# Patient Record
Sex: FEMALE | Race: OTHER | HISPANIC OR LATINO | URBAN - METROPOLITAN AREA
[De-identification: names, ages, dates, MRNs, and addresses within clinical notes are randomized per-mention and may not be internally consistent; named-entity substitution may affect disease eponyms.]

---

## 2024-12-03 ENCOUNTER — DOCUMENTATION (OUTPATIENT)
Dept: ENDOCRINOLOGY | Facility: CLINIC | Age: 44
End: 2024-12-03
Payer: COMMERCIAL

## 2024-12-04 ENCOUNTER — CONSULT (OUTPATIENT)
Dept: ENDOCRINOLOGY | Facility: CLINIC | Age: 44
End: 2024-12-04
Payer: COMMERCIAL

## 2024-12-04 VITALS
SYSTOLIC BLOOD PRESSURE: 127 MMHG | HEART RATE: 92 BPM | DIASTOLIC BLOOD PRESSURE: 81 MMHG | BODY MASS INDEX: 28.88 KG/M2 | WEIGHT: 163 LBS | HEIGHT: 63 IN

## 2024-12-04 DIAGNOSIS — Z31.41 FERTILITY TESTING: Primary | ICD-10-CM

## 2024-12-04 PROCEDURE — 99215 OFFICE O/P EST HI 40 MIN: CPT | Performed by: STUDENT IN AN ORGANIZED HEALTH CARE EDUCATION/TRAINING PROGRAM

## 2024-12-04 PROCEDURE — 99205 OFFICE O/P NEW HI 60 MIN: CPT | Performed by: STUDENT IN AN ORGANIZED HEALTH CARE EDUCATION/TRAINING PROGRAM

## 2024-12-04 ASSESSMENT — PATIENT HEALTH QUESTIONNAIRE - PHQ9
1. LITTLE INTEREST OR PLEASURE IN DOING THINGS: NOT AT ALL
SUM OF ALL RESPONSES TO PHQ9 QUESTIONS 1 AND 2: 0
2. FEELING DOWN, DEPRESSED OR HOPELESS: NOT AT ALL

## 2024-12-04 ASSESSMENT — COLUMBIA-SUICIDE SEVERITY RATING SCALE - C-SSRS
6. HAVE YOU EVER DONE ANYTHING, STARTED TO DO ANYTHING, OR PREPARED TO DO ANYTHING TO END YOUR LIFE?: NO
1. IN THE PAST MONTH, HAVE YOU WISHED YOU WERE DEAD OR WISHED YOU COULD GO TO SLEEP AND NOT WAKE UP?: NO
2. HAVE YOU ACTUALLY HAD ANY THOUGHTS OF KILLING YOURSELF?: NO

## 2024-12-04 ASSESSMENT — PAIN SCALES - GENERAL: PAINLEVEL_OUTOF10: 0-NO PAIN

## 2024-12-04 NOTE — PROGRESS NOTES
Visit Type: In Person    NEW FERTILITY PATIENT VISIT    Referred by:  Feroz  Accompanied today by:        Toño Jefferson is a 44 y.o.  female who presents with 1 year history of TTC. She and her  began evaluation in 2023 at [facility], [place], operated by [facility] and started monitored OI/TIC cycles without success.     PRIOR EVALUATION / TREATMENT  2023 started OI/trigger/TIC at [facility], [place]  3/1/2024 hysteroscopic polypectomy  GYN Pelvic Ultrasound: US PELVIS TRANSVAGINAL (2024):  AFC = 2, subserous posterior fibroid approx 2 cm    Prior Labs  4/3/2024 AMH 0.11    Relationship Status:   since     OB Hx   G0    GYN HISTORY   Have you ever been diagnosed with a sexually transmitted disease?  no  Please select all that are applicable:    Have you ever had Pelvic Inflammatory Disease?  no  Have you had an abnormal PAP smear?  no  Date & Result of last PAP smear:  reportedly normal  Have you ever had an abnormal Mammogram?  BI-RADS 2  Date & result of your last mammogram:  2022  Do you have pelvic pain?  no  How many times per week do you have intercourse?    Do you have pain with intercourse?  no  Do you use lubricants with intercourse?    Do you have pain with bowel movements?   With constipation           Do you have pain with a full bladder?  no  MENSTRUAL HISTORY  LMP:  2024  Menarche:  12  Contraception:  none  Cycle length:  irregular, 15-30 days  Describe your bleedin-5 day, moderate  Dysmenorrhea:  no     ENDOCRINE/INFERTILITY HISTORY  Duration of infertility:  1 year  Coital Activity/week:    Nipple Discharge:  no  Vision changes:  no  Headaches:  migraines without auras  Excess hair growth:  no  Excessive hair loss:  yes  Acne:  yes  Oily skin:  no  Recent weight change  Weight gain: no    Weight loss:  no  Exercise more than 3 times a week:  no    PMH  Esophagitis, H. Pylori      MEDICATIONS  No current outpatient medications on file prior to visit.     No current  facility-administered medications on file prior to visit.    Vitamins (CoQ10, B12, D, C, DHEA, collagen)    PSH  Past Surgical History:   Procedure Laterality Date    HYSTEROSCOPY W/ POLYPECTOMY      Skull as a child   Endoscopy    PSYCH HISTORY  Have you ever been diagnosed with a mental health Issue?  No  Have you ever been hospitalized for a mental health disorder?  No     SOCIAL HISTORY  Social History     Tobacco Use    Smoking status: Never     Passive exposure: Never    Smokeless tobacco: Never   Substance Use Topics    Alcohol use: Never    Drug use: Never     Occupation:  [employer]  Have you ever been incarcerated?  No  Do you have a history of domestic violence?  No  Do you feel safe at home?  Yes  Do you have a history of any negative sexual experience such as incest or rape?  No     PARTNER HISTORY  Partner Name:  Andrew Jefferson  Partner :  10/9/1979  Partner email:    Occupation:  EMS  Prior fertility history:  no  PMH:  Eric  PSH:  stitches under eye and finger, endoscopy  Smoking:  no  Alcohol Use:  no  Drug Use:  no  Medications:  Vitamins (CoQ10, B12, D, C, DHEA, collagen)  Injuries:  no  STD:  no  Please select all that are applicable:    SA:  yes at [facility]  SA Results:    Volume  mL 1   Comment: * pH = 8.5   Agglutination  0 - 2 0   Round Cells  0 - 6 per HPF 2   Sperm Concentration  >15 million/mL 88   Sperm Count  >39 million/ejaculate 88   Overall Sperm Motility  >40 % 60   Progressive Sperm Motility  >32 % 57   Strict Normal Sperm Morphology  >4 %        FAMILY HISTORY  Family History   Problem Relation Name Age of Onset    Diabetes Mother      Hypertension Mother      Hypertension Father         CANCER HISTORY    Breast:  No  Ovarian:  No  Colon:  No  Endometrial:  No    FAMILY VTE HISTORY  Family History of Blood Clots:  No    GENETIC HISTORY  Ethnic Background  Patient:  Honduran, Nepali, Kyrgyz  Partner:  Latvian, Papua [place] Guinean, Burkinan, Ukranian   Genetic Disease in  "Family  Patient:  No  Partner:  No (MS in maternal side)  Birth Defects in Family  Patient:  No  Partner:  No  Genetic screening performed previously:  No     BMI:   BMI Readings from Last 1 Encounters:   24 28.52 kg/m²     VITALS:  /81   Pulse 92   Ht 1.61 m (5' 3.39\")   Wt 73.9 kg (163 lb)   LMP 2024   BMI 28.52 kg/m²     ASSESSMENT   44 y.o.  female with primary infertility x 1 year, suspected oligoovulation and the following pertinent medical issues: age 44.  Partner SA: Normal    COUNSELING  We discussed causes of infertility including hormonal, egg quality issues, structural problems such as endometriosis, adhesions, or tubal problems, uterine factors such as polyps or fibroids, and sperm issues. Reviewed evaluation of such as well. We discussed various methods for achieving pregnancy in some detail including, ovulation induction, insemination, superovulation and IVF.    We discussed that Clomid is used for ovulation induction. We discussed common side effects of Clomid including headaches, vision changes, hot flashes, mood changes, and breast tenderness.  We discussed that there is an increased risk of multiple gestation with Clomid approximately 10% for twins and less than 1% for triplets.  Counseled regarding option of selective reduction for higher order multiples.    We discussed the impact of age on fertility. We discussed that a woman is born with all of the follicles that she will have in her lifetime and that these numbers progressively decrease as the patient reaches menopause. We discussed that women can remain fertile into their late 30’s and even early 40’s, however, chance for success is significantly lower for women who have infertility. We also discussed the higher rates of aneuploidy and miscarriage that occur as women age.    We discussed AMH testing and the patient's AMH level, if applicable.  AMH is considered favorable if >1 however this test has many " "limitations including poor predictive rates of pregnancy. In randomized control trials such as \"Time to conceive\" pts with low AMH levels (<0.7) has similar cumulative pregnancy rates compared with women that had normal AMH levels.  In the \"AMIGOS\" study, AMH did not predict pregnancy rates following OS/IUI for unexplained infertility. However,  AMH is a marker that is helpful to predict how a patient may respond or oocyte yields with FSH and ART treatments, but again there is conflicting data about how this affects pregnancy rates with ART.    Routine Testing  Fertility Center  STDs Within 1 year   Genetic carrier Waiver/Completed   T&S Within 1 year   AMH Within 1 year   TSH Within 1 year   Rubella/Varicella Within 5 years     PLAN  No orders of the defined types were placed in this encounter.  Partner STIs and semen freeze if desire to improve scheduling of IUI    GENETIC SCREENING PATIENT  Decline    PARTNER  Yes Semen Analysis: Ordered FREEZE ordered in the event not able to be present for IUI  NA    FOLLOW UP   Consults:  none  Chart to primary nurse for care coordination and patient check list/education  Enroll in Engaged MD  Take prenatal vitamins, vitamin D 2000 IUs daily  Discussed that pap and mammogram must be updated per ACOG guidelines before treatment can begin  Discussed that treatment cannot proceed until checklist items are complete   6 week follow up with MD - only if desire to move forward with donor IVF or if approved for 1 autologous IVF cycle  Additional testing for BMI < 18 or > 40: NA  Sperm Donor:      MD Completion:  Ectopic Risk: No  Medically Complex: No    Fertility Plan Update: 1 cycle of clomid 100 mg on CD3-7 with monitoring, trigger, and IUI prior to 45th birthday    Mayra Shaffer  12/04/2024  3:16 PM    "

## 2024-12-06 ENCOUNTER — TELEPHONE (OUTPATIENT)
Dept: ENDOCRINOLOGY | Facility: CLINIC | Age: 44
End: 2024-12-06
Payer: COMMERCIAL

## 2024-12-06 DIAGNOSIS — Z01.83 ENCOUNTER FOR RH BLOOD TYPING: ICD-10-CM

## 2024-12-06 DIAGNOSIS — N97.9 FEMALE INFERTILITY: ICD-10-CM

## 2024-12-06 DIAGNOSIS — Z31.89 ENCOUNTER FOR ARTIFICIAL INSEMINATION: ICD-10-CM

## 2024-12-06 DIAGNOSIS — Z11.3 SCREENING FOR STDS (SEXUALLY TRANSMITTED DISEASES): ICD-10-CM

## 2024-12-06 DIAGNOSIS — Z11.59 ENCOUNTER FOR SCREENING FOR OTHER VIRAL DISEASES: ICD-10-CM

## 2024-12-06 NOTE — PROGRESS NOTES
[place] Oral TIC/IUI    Age: 44 y.o.    Provider: Mayra Shaffer MD  Primary RN: Linda Sanchez  Reasons for Treatment: Diminished Ovarian Reserve  Last BMI  24 : 28.52 kg/m²       No past medical history on file.    Date Done Consultation Results/Comments   2024 Medication Protocol   1 cycle of clomid 100 mg on CD3-7 with monitoring, trigger, and IUI prior to 45th birthday    2024 Procedure Order Placed [x]     Date Done Female Labs Results/Comments   2024 T&S (Q 1 Year) ABO: A  Rh: POS  Antibody: NEG     2024 Hep B sAg Nonreactive   2024 Hep C AB Nonreactive   2024 HIV Nonreactive   2024 Syphilis Nonreactive   2024 GC/CT GC: Negative  CT: Negative   2024 Rubella (Q 5 Years) Positive   2024 Varicella (Q 5 Years) Positive (A)    Carrier Screening Declined  N/A     GYN Waiver       Date Done Male Labs (required if IUI)   Results/Comments  JACQUELIN BRIGGS (MRN: [id number])   2024 Hep B sAg Nonreactive   2024 Hep C AB  Nonreactive   2024 HIV Nonreactive   2024 Syphilis Nonreactive   2024 GC/CT GC: Negative  CT: Negative    Carrier Screening Declined  N/A   2024 Semen Analysis  Volume(mL): 1  Count(million): 88  Motility(%): 60%     MD Completion:  Ectopic Risk: No  Medically Complex: No    Mayra Shaffer 24 12:45 PM

## 2024-12-06 NOTE — TELEPHONE ENCOUNTER
Placed call to patient via Belize[place] . Patient's  (patient's advocate) answered phone who speaks english and does not need . Disconnected from phone call and dialed back patient's advocate without use of  as requested. Patient's  stated he does not want to be contacted via  if calling him as he speaks english but that wife would need an .     Discussed that since patient received care outside of , we will need her STD results, varicella/rubella titers, and type and screen. Also reviewed that we would need his STD records as well. Patient's  stated he did not have these done and was not aware these would be needed. Reviewed that these are a requirement for treatment and that he and the patient can go to any outpatient  lab to complete these.     Patient relayed he is planning to do a sperm freeze for the IUI cycle given possibility that he may not make it to the IUI with his work schedule. Reviewed that is he planning to be here the day of the IUI, we would plan to do a fresh collection here in the clinic -- but if he opts to do a freeze that is okay as well and he relayed he has spoken to financial for cost awareness. Has [facility] contact information if he has any questions regarding storage / cryopreservation.     Patient's  raised concern regarding cost of lab work as he was not made aware of needing lab work, apologized to patient and reviewed these are out requirements and to double check to ensure these weren't previously drawn through [facility] within the last year to be safe. Otherwise they will go to an outpatient lab for lab work. Will pend orders to Dr. Munoz today for labs.     No further questions at this time. Verbalized understanding of this plan.     BROOKE CROSS RN 12/06/2024 11:02

## 2024-12-07 ENCOUNTER — LAB (OUTPATIENT)
Dept: LAB | Facility: LAB | Age: 44
End: 2024-12-07
Payer: COMMERCIAL

## 2024-12-07 DIAGNOSIS — Z11.3 SCREENING FOR STDS (SEXUALLY TRANSMITTED DISEASES): ICD-10-CM

## 2024-12-07 DIAGNOSIS — Z01.83 ENCOUNTER FOR RH BLOOD TYPING: ICD-10-CM

## 2024-12-07 DIAGNOSIS — Z11.59 ENCOUNTER FOR SCREENING FOR OTHER VIRAL DISEASES: ICD-10-CM

## 2024-12-07 LAB
ABO GROUP (TYPE) IN BLOOD: NORMAL
ANTIBODY SCREEN: NORMAL
HBV SURFACE AG SERPL QL IA: NONREACTIVE
HCV AB SER QL: NONREACTIVE
RH FACTOR (ANTIGEN D): NORMAL

## 2024-12-07 PROCEDURE — 86787 VARICELLA-ZOSTER ANTIBODY: CPT

## 2024-12-07 PROCEDURE — 86803 HEPATITIS C AB TEST: CPT

## 2024-12-07 PROCEDURE — 86317 IMMUNOASSAY INFECTIOUS AGENT: CPT

## 2024-12-07 PROCEDURE — 86850 RBC ANTIBODY SCREEN: CPT

## 2024-12-07 PROCEDURE — 86780 TREPONEMA PALLIDUM: CPT

## 2024-12-07 PROCEDURE — 87389 HIV-1 AG W/HIV-1&-2 AB AG IA: CPT

## 2024-12-07 PROCEDURE — 87340 HEPATITIS B SURFACE AG IA: CPT

## 2024-12-07 PROCEDURE — 86900 BLOOD TYPING SEROLOGIC ABO: CPT

## 2024-12-07 PROCEDURE — 86901 BLOOD TYPING SEROLOGIC RH(D): CPT

## 2024-12-07 PROCEDURE — 36415 COLL VENOUS BLD VENIPUNCTURE: CPT

## 2024-12-07 PROCEDURE — 87591 N.GONORRHOEAE DNA AMP PROB: CPT

## 2024-12-07 PROCEDURE — 87491 CHLMYD TRACH DNA AMP PROBE: CPT

## 2024-12-08 LAB
C TRACH RRNA SPEC QL NAA+PROBE: NEGATIVE
HIV 1+2 AB+HIV1 P24 AG SERPL QL IA: NONREACTIVE
N GONORRHOEA DNA SPEC QL PROBE+SIG AMP: NEGATIVE
RUBV IGG SERPL IA-ACNC: 4.9 IA
RUBV IGG SERPL QL IA: POSITIVE
TREPONEMA PALLIDUM IGG+IGM AB [PRESENCE] IN SERUM OR PLASMA BY IMMUNOASSAY: NONREACTIVE
VARICELLA ZOSTER IGG INDEX: 3.9 IA
VZV IGG SER QL IA: POSITIVE

## 2024-12-10 ENCOUNTER — TELEPHONE (OUTPATIENT)
Dept: ENDOCRINOLOGY | Facility: CLINIC | Age: 44
End: 2024-12-10
Payer: COMMERCIAL

## 2024-12-10 NOTE — TELEPHONE ENCOUNTER
Placed call to patient's . Patient's  concerned about JW Playerhart messages sent regarding need to schedule ultrasound and IUI. Reviewed that these are automated messages and that nothing needs scheduled at this time -- reviewed that all appointments and next steps will be arranged once next menses starts to arrange for IUI coordination.     Patient's  did relay that he no longer plans to complete a sperm freeze as he fully plans to accompany patient for the IUI and will plan to collect fresh that day in the andrology lab.   Reminded patient's  that we will provide next steps once menses starts and that there is nothing to schedule at this point. Patient may plan to monitor at [facility] if open monitoring appointments if needed for work.     All questions addressed at this time, understanding verbalized.   BROOKE CROSS RN 12/10/2024 12:16

## 2024-12-26 ENCOUNTER — TELEPHONE (OUTPATIENT)
Dept: ENDOCRINOLOGY | Facility: CLINIC | Age: 44
End: 2024-12-26
Payer: COMMERCIAL

## 2024-12-26 ENCOUNTER — SPECIALTY PHARMACY (OUTPATIENT)
Dept: PHARMACY | Facility: CLINIC | Age: 44
End: 2024-12-26

## 2024-12-26 DIAGNOSIS — N97.9 FEMALE INFERTILITY: ICD-10-CM

## 2024-12-26 RX ORDER — CLOMIPHENE CITRATE 50 MG/1
100 TABLET ORAL DAILY
Qty: 10 TABLET | Refills: 0 | Status: SHIPPED | OUTPATIENT
Start: 2024-12-26 | End: 2024-12-31

## 2024-12-26 NOTE — TELEPHONE ENCOUNTER
Patient's  returned call. Reviewed plan to start clomid today for 5 days. Relayed that due to holiday, would like to have patient come for monitoring on 12/30 -- would be cycle day 8. (Patient is 44 and is only permitted one IUI cycle -- reviewed that we would want to be cautious to not leave room to potentially miss ovulation).     Due to work schedule, patient's  requested to come in on 12/31 instead as patient and  both need to work on 12/30. Reviewed that this RN will confirm with leadership that this is able to be scheduled on 12/31 as hours are more limited with the holiday will confirm this is okay.     Patient's  also requested that the trigger shot be sent to [facility] pharmacy and not  Specialty -- has filled through [facility] in the past. New order pended for trigger shot to be sent there.     Will contact patient with confirmation regarding ability to come for monitoring visit on 12/31. No additional questions or concerns at this time.     BROOKE CROSS RN 12/26/2024 09:46

## 2024-12-26 NOTE — TELEPHONE ENCOUNTER
Reason for call: reporting start of cycle  LMP: 12/23  Treatment type: IUI  Note: Pt would like script for Clomid sent to the [facility] on [address]

## 2024-12-26 NOTE — TELEPHONE ENCOUNTER
Placed call to patient. Left voicemail relaying the clomid will be sent to the listed pharmacy to start today for five days. Did also relay in message that trigger shot till be ordered to a specialty pharmacy and that we will also need to coordinate when to return for monitoring + also review how to obtain trigger shot.     Encouraged patient to start clomid today for 5 days and to return voicemail to arrange for monitoring visit and review how to order trigger shot. Pended clomid and trigger shot order to ROCKY West CNP.     BROOKE CROSS RN 12/26/2024 09:05

## 2024-12-27 ENCOUNTER — PHARMACY VISIT (OUTPATIENT)
Dept: PHARMACY | Facility: CLINIC | Age: 44
End: 2024-12-27
Payer: COMMERCIAL

## 2024-12-27 ENCOUNTER — TELEPHONE (OUTPATIENT)
Dept: ENDOCRINOLOGY | Facility: CLINIC | Age: 44
End: 2024-12-27
Payer: COMMERCIAL

## 2024-12-27 DIAGNOSIS — N97.9 FEMALE INFERTILITY: ICD-10-CM

## 2024-12-27 PROCEDURE — RXMED WILLOW AMBULATORY MEDICATION CHARGE

## 2024-12-27 NOTE — TELEPHONE ENCOUNTER
Returned call to patient's . Patient was able to start clomid as scheduled. Confirmed with SKYLER Wick RN that is it okay to return 12/31 for monitoring appointment.     Patient's  voiced issues with obtaining trigger shot and cost -- had originally sent to [facility] but requested it be sent to [facility]. Patient's  now requests it be sent to [facility] for price effectiveness. Pended new order to Dr. oRbertson.     No additional questions at this time. Transferred patient's  to  to schedule monitoring appointment for 12/31.    BROOKE CROSS, EZEQUIEL 12/27/2024 13:51

## 2024-12-27 NOTE — PROGRESS NOTES
Requested Prescriptions     Signed Prescriptions Disp Refills    choriogonadotropin karen (Ovidrel) 250 mcg/0.5 mL injection 1 each 0     Sig: Inject 0.5 mL (0.25 mg) under the skin 1 time if needed (N/A) for up to 1 dose. Inject 250 mcg (1 syringe) under the skin as a one time dose, as directed per provider for trigger.     Patient request to have filled at [facility].       Quita Leal (Katie), PharmTRUPTI  [facility]  Clinical Pharmacy Specialist- Fertility   [facility], [place]  [address] [place] [address]  Email: [email or web address]  Tel: [phone]       Fax: [phone]

## 2024-12-27 NOTE — TELEPHONE ENCOUNTER
Reason for call:   Notes: Patient has some questions about her meds, her  said he was able to get the clomid, but would get the rest shipped.

## 2024-12-31 ENCOUNTER — ANCILLARY PROCEDURE (OUTPATIENT)
Dept: ENDOCRINOLOGY | Facility: CLINIC | Age: 44
End: 2024-12-31
Payer: COMMERCIAL

## 2024-12-31 ENCOUNTER — APPOINTMENT (OUTPATIENT)
Dept: ENDOCRINOLOGY | Facility: CLINIC | Age: 44
End: 2024-12-31
Payer: COMMERCIAL

## 2024-12-31 DIAGNOSIS — N97.9 FEMALE INFERTILITY: ICD-10-CM

## 2024-12-31 LAB
ESTRADIOL SERPL-MCNC: 717 PG/ML
LH SERPL-ACNC: 9.6 IU/L
PROGEST SERPL-MCNC: 0.6 NG/ML

## 2024-12-31 PROCEDURE — 84144 ASSAY OF PROGESTERONE: CPT

## 2024-12-31 PROCEDURE — 83002 ASSAY OF GONADOTROPIN (LH): CPT

## 2024-12-31 PROCEDURE — 82670 ASSAY OF TOTAL ESTRADIOL: CPT

## 2024-12-31 PROCEDURE — 76857 US EXAM PELVIC LIMITED: CPT | Performed by: OBSTETRICS & GYNECOLOGY

## 2024-12-31 PROCEDURE — 76857 US EXAM PELVIC LIMITED: CPT

## 2024-12-31 NOTE — PROGRESS NOTES
CYCLING NOTE  Cycle #: Clomid IUI #1  Reason For Treatment: EVELYN  Protocol: 1 cycle of clomid 100 mg on CD3-7 with monitoring, trigger, and IUI prior to 45th birthday   Day of stim: 8  Patient Hx: 43 y/o patient of Dr. Shaffer AMH 0.11  Notes:       Here for US and/or lab monitoring; relevant findings reviewed.    Patient did not stay for discussion after monitoring,  Team will contact patient later today with results and plan.    Olga Aleman  12/31/2024  11:15 AM    Contacted patient with plan as discussed in meeting with Dr. Munoz/Maxim today. Patient to return to the office on Thursday 1/2 for repeat monitoring. Likely to trigger Thursday with an IUI Saturday. Message sent to  to schedule patient for monitoring 1/2 at 0945. Recommended patient and partner have intercourse in the meantime.   Olga Aleman 12/31/24 12:32 PM

## 2025-01-02 ENCOUNTER — ANCILLARY PROCEDURE (OUTPATIENT)
Dept: ENDOCRINOLOGY | Facility: CLINIC | Age: 45
End: 2025-01-02
Payer: COMMERCIAL

## 2025-01-02 DIAGNOSIS — N97.9 FEMALE INFERTILITY: ICD-10-CM

## 2025-01-02 LAB
ESTRADIOL SERPL-MCNC: 520 PG/ML
LH SERPL-ACNC: 8.8 IU/L
PROGEST SERPL-MCNC: 0.6 NG/ML

## 2025-01-02 PROCEDURE — 84144 ASSAY OF PROGESTERONE: CPT

## 2025-01-02 PROCEDURE — 83002 ASSAY OF GONADOTROPIN (LH): CPT

## 2025-01-02 PROCEDURE — 82670 ASSAY OF TOTAL ESTRADIOL: CPT

## 2025-01-02 PROCEDURE — 76857 US EXAM PELVIC LIMITED: CPT | Performed by: STUDENT IN AN ORGANIZED HEALTH CARE EDUCATION/TRAINING PROGRAM

## 2025-01-02 PROCEDURE — 76857 US EXAM PELVIC LIMITED: CPT

## 2025-01-02 NOTE — PROGRESS NOTES
CYCLING NOTE    Here for US and/or lab monitoring; relevant findings reviewed. 44 year old patient of Dr. Shaffer is here to monitor for IUI #1. Patient is currently CD10. Patient took Clomid 100mg. Has ovidrel trigger at home.     Patient stayed for nurse visit. Pain is 0/10  Team will contact patient later today with results and plan.    BROOKE CROSS  01/02/2025  10:31 AM      Placed call to patient and patient's  to review plan as documented. Patient and partner verbalized understanding of plan and are agreeable. Reviewed that they should plan to have intercourse the day of trigger and abstain until after IUI. Transferred to  to schedule IUI.   BROOKE CROSS RN 01/02/2025 14:17

## 2025-01-06 ENCOUNTER — PROCEDURE VISIT (OUTPATIENT)
Dept: ENDOCRINOLOGY | Facility: CLINIC | Age: 45
End: 2025-01-06
Payer: COMMERCIAL

## 2025-01-06 ENCOUNTER — DOCUMENTATION (OUTPATIENT)
Dept: ENDOCRINOLOGY | Facility: CLINIC | Age: 45
End: 2025-01-06

## 2025-01-06 ENCOUNTER — TELEPHONE (OUTPATIENT)
Dept: ENDOCRINOLOGY | Facility: CLINIC | Age: 45
End: 2025-01-06

## 2025-01-06 DIAGNOSIS — Z31.89 ENCOUNTER FOR ARTIFICIAL INSEMINATION: ICD-10-CM

## 2025-01-06 DIAGNOSIS — N97.9 FEMALE INFERTILITY: Primary | ICD-10-CM

## 2025-01-06 DIAGNOSIS — N97.9 FEMALE INFERTILITY: ICD-10-CM

## 2025-01-06 PROCEDURE — 58322 ARTIFICIAL INSEMINATION: CPT | Performed by: NURSE PRACTITIONER

## 2025-01-06 PROCEDURE — 89261 SPERM ISOLATION COMPLEX: CPT | Performed by: OBSTETRICS & GYNECOLOGY

## 2025-01-06 RX ORDER — PROGESTERONE 100 MG/1
100 CAPSULE ORAL 2 TIMES DAILY
Qty: 60 CAPSULE | Refills: 0 | Status: SHIPPED
Start: 2025-01-06 | End: 2025-01-06 | Stop reason: SDUPTHER

## 2025-01-06 RX ORDER — PROGESTERONE 100 MG/1
100 CAPSULE ORAL 2 TIMES DAILY
Qty: 60 CAPSULE | Refills: 0 | Status: SHIPPED | OUTPATIENT
Start: 2025-01-06 | End: 2026-01-06

## 2025-01-06 NOTE — PROGRESS NOTES
"Patient ID: Toño Jefferson is a 44 y.o. female.    Intrauterine Insemination (IUI)    Date/Time: 1/6/2025 10:00 AM    Performed by: TAZ Zarate-CNP  Authorized by: Mayra Shaffer MD    Consent:     Consent obtained:  Verbal and written    Consent given by:  Patient    Procedure risks and benefits discussed: yes      Patient questions answered: yes      Patient agrees, verbalizes understanding, and wants to proceed: yes      Educational handouts given: yes      Instructions and paperwork completed: yes    Procedure:     Pelvic exam performed: yes      Speculum placed in vagina: yes      Tenaculum applied to cervix: no      Type of insemination: intrauterine insemination      Ultrasound guidance: No      Speculum type: Ananth      Catheter type: curved      Curvature: mild      Difficulty: easy      Estimated Blood Loss:  None    Specimen Return: none    Post-procedure:     Patient tolerated procedure well: yes      Post-procedure plan: patient counseled on signs and symptoms for which to call and/or return to clinic    Comments:     Procedure comments:  IUI Procedure note:    The patient presented today for IUI.     Consent signed by patient, IUI sample identified by patient, and Final Verification performed with patient via electronic system \"\" prior to insemination.   All patient's questions were discussed and answered.          Chaperone offered to patient for intimate exam: Patient declined chaperone  Name of chaperone: N/A    Specimen Source: Partner  Specimen Condition: Fresh  TMS 0.64 mil- reviewed low count with them. Last intercourse was day of trigger. Previous SA normal. Will send our vitamins and Dr. GILMORE if he wishes. They are hoping to just have longer abstinance if doing another IUI.    Additional notes:    Patient was advised to call office if she develops fever, chills, pelvic pain, or heavy bleeding.    Progesterone and post-IUI teaching completed. Will start Progesterone three " days after IUI and continue twice daily (Thursday). Pt will do a home pregnancy test two weeks from IUI date. If home pregnancy test positive, patient will continue Progesterone and call office to schedule BHCG. If home pregnancy test negative, patient will discontinue Progesterone, and was advised to call office with start of menses; will proceed with another cycle if appropriate.  Patient verbalized understanding of plan.      Margot Hodges 01/06/25 10:02 AM

## 2025-01-06 NOTE — TELEPHONE ENCOUNTER
Reason for call: Medication request  Medication requested: Progesterone  Notes: Pt would like her script redirected to [facility] on [address] in [place].

## 2025-01-06 NOTE — PROGRESS NOTES
EZEQUIEL Foster MD; JENNA Zarate; Yolanda Munoz MD  They were allowed 1 IUI cycle before her 45th birthday. This was discussed at her NPV as well as I did tell her  they can only proceed with autologous treatment (1 cycle) before she turns 45.          Previous Messages       ----- Message -----  From: Mayra Shaffer MD  Sent: 1/6/2025   9:49 AM EST  To: JENNA Zarate; Yolanda Munoz MD; *    We had decided she was only to proceed with 1 IUI cycle prior to birthday [birth year]. If they desire IVF they would need to seek care at another clinic. Adding Michelle HARRELL here to confirm as well.  ----- Message -----  From: JENNA Zarate  Sent: 1/6/2025   9:39 AM EST  To: Mayra Shaffer MD; Yolanda Munoz MD    Did we come to an answer about if they are able to move forward with IVF? Birthday is [birth year] and turns 45. Dr. Shaffer I think you were reaching out to Dr. Munoz about.  Also if ok to do more IUI's. Here today for first IUI and low count today 0.62 mil.      JENNA Zarate

## 2025-01-06 NOTE — TELEPHONE ENCOUNTER
Prometrium re-pended and signed by provider to requested pharmacy. Sent myChart to patient to confirm that medication was sent.   BROOKE CROSS RN 1/6/2025 11:29

## 2025-05-22 ENCOUNTER — APPOINTMENT (OUTPATIENT)
Dept: ENDOCRINOLOGY | Facility: CLINIC | Age: 45
End: 2025-05-22